# Patient Record
Sex: MALE | Race: BLACK OR AFRICAN AMERICAN | HISPANIC OR LATINO | URBAN - METROPOLITAN AREA
[De-identification: names, ages, dates, MRNs, and addresses within clinical notes are randomized per-mention and may not be internally consistent; named-entity substitution may affect disease eponyms.]

---

## 2019-12-16 ENCOUNTER — NEW PATIENT (OUTPATIENT)
Dept: URBAN - METROPOLITAN AREA CLINIC 30 | Facility: CLINIC | Age: 64
End: 2019-12-16
Payer: COMMERCIAL

## 2019-12-16 PROCEDURE — 92004 COMPRE OPH EXAM NEW PT 1/>: CPT | Performed by: OPTOMETRIST

## 2019-12-16 PROCEDURE — 92134 CPTRZ OPH DX IMG PST SGM RTA: CPT | Performed by: OPTOMETRIST

## 2019-12-16 ASSESSMENT — INTRAOCULAR PRESSURE
OS: 12
OD: 13

## 2019-12-16 ASSESSMENT — VISUAL ACUITY
OD: 20/25
OS: 20/25

## 2020-03-02 ENCOUNTER — FOLLOW UP ESTABLISHED (OUTPATIENT)
Dept: URBAN - METROPOLITAN AREA CLINIC 30 | Facility: CLINIC | Age: 65
End: 2020-03-02
Payer: COMMERCIAL

## 2020-03-02 PROCEDURE — 92133 CPTRZD OPH DX IMG PST SGM ON: CPT | Performed by: OPTOMETRIST

## 2020-03-02 PROCEDURE — 92083 EXTENDED VISUAL FIELD XM: CPT | Performed by: OPTOMETRIST

## 2020-03-02 PROCEDURE — 99213 OFFICE O/P EST LOW 20 MIN: CPT | Performed by: OPTOMETRIST

## 2020-03-02 PROCEDURE — 76514 ECHO EXAM OF EYE THICKNESS: CPT | Performed by: OPTOMETRIST

## 2020-03-02 ASSESSMENT — INTRAOCULAR PRESSURE
OD: 12
OS: 10

## 2021-04-27 ENCOUNTER — OFFICE VISIT (OUTPATIENT)
Dept: URBAN - METROPOLITAN AREA CLINIC 30 | Facility: CLINIC | Age: 66
End: 2021-04-27
Payer: COMMERCIAL

## 2021-04-27 DIAGNOSIS — H40.023 OPEN ANGLE WITH BORDERLINE FINDINGS, HIGH RISK, BILATERAL: Primary | ICD-10-CM

## 2021-04-27 DIAGNOSIS — H04.123 DRY EYE SYNDROME OF BILATERAL LACRIMAL GLANDS: ICD-10-CM

## 2021-04-27 DIAGNOSIS — H43.393 OTHER VITREOUS OPACITIES, BILATERAL: ICD-10-CM

## 2021-04-27 DIAGNOSIS — H11.153 PINGUECULA, BILATERAL: ICD-10-CM

## 2021-04-27 PROCEDURE — 92133 CPTRZD OPH DX IMG PST SGM ON: CPT | Performed by: OPTOMETRIST

## 2021-04-27 PROCEDURE — 92014 COMPRE OPH EXAM EST PT 1/>: CPT | Performed by: OPTOMETRIST

## 2021-04-27 RX ORDER — CARBOXYMETHYLCELLULOSE SODIUM 5 MG/ML
0.5 % SOLUTION/ DROPS OPHTHALMIC
Qty: 10 | Refills: 11 | Status: ACTIVE
Start: 2021-04-27

## 2021-04-27 ASSESSMENT — KERATOMETRY
OD: 42.35
OS: 42.48

## 2021-04-27 ASSESSMENT — INTRAOCULAR PRESSURE
OD: 12
OS: 11

## 2021-04-27 ASSESSMENT — VISUAL ACUITY
OS: 20/20
OD: 20/20

## 2021-04-27 NOTE — IMPRESSION/PLAN
Impression: Open angle with borderline findings, high risk, bilateral
+FHx-father Pachs 497/503 Plan: Significant cupping OU. IOPs stable and low, generally have been in low teens per previous records. Thin Pachs. 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 VF 3/2020 scatter OD and unreliable OS, watch for sup arc/nasal step. Update VF next visit.

## 2021-04-27 NOTE — IMPRESSION/PLAN
Impression: Dry eye syndrome of bilateral lacrimal glands: H04.123. Plan: Patient notes intermittent mild eye pain. He is not using ATs. Start ATs BID-QID OU.

## 2021-12-04 NOTE — IMPRESSION/PLAN
Impression: Other vitreous opacities, bilateral Plan: Stable. Retinas are flat and attached OU. Reviewed s/sx of RD and to call asap if occurs. Speaking Coherently

## 2022-10-21 ENCOUNTER — OFFICE VISIT (OUTPATIENT)
Dept: URBAN - METROPOLITAN AREA CLINIC 30 | Facility: CLINIC | Age: 67
End: 2022-10-21
Payer: COMMERCIAL

## 2022-10-21 DIAGNOSIS — H40.023 OPEN ANGLE WITH BORDERLINE FINDINGS, HIGH RISK, BILATERAL: Primary | ICD-10-CM

## 2022-10-21 DIAGNOSIS — H43.393 OTHER VITREOUS OPACITIES, BILATERAL: ICD-10-CM

## 2022-10-21 DIAGNOSIS — H04.123 DRY EYE SYNDROME OF BILATERAL LACRIMAL GLANDS: ICD-10-CM

## 2022-10-21 PROCEDURE — 92133 CPTRZD OPH DX IMG PST SGM ON: CPT

## 2022-10-21 PROCEDURE — 92014 COMPRE OPH EXAM EST PT 1/>: CPT

## 2022-10-21 ASSESSMENT — INTRAOCULAR PRESSURE
OS: 16
OD: 16

## 2022-10-21 ASSESSMENT — VISUAL ACUITY
OD: 20/20
OS: 20/20

## 2022-10-21 ASSESSMENT — KERATOMETRY
OD: 42.44
OS: 42.30

## 2022-10-21 NOTE — IMPRESSION/PLAN
Impression: Open angle with borderline findings, high risk, bilateral
+FHx-father Pachs 497/503 Plan: Significant cupping OU. IOPs stable and low, generally have been in low teens per previous records. Thin Pachs. 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 VF 3/2020 scatter OD and unreliable OS, watch for sup arc/nasal step. Update VF next visit. IOP today 16/16 on no gtts. OCT RNFL stable OD, OS. GCC inferior thinning but w/o corresponding RNFL loss. Monitor. Update HVF.

## 2024-05-16 ENCOUNTER — OFFICE VISIT (OUTPATIENT)
Dept: URBAN - METROPOLITAN AREA CLINIC 30 | Facility: CLINIC | Age: 69
End: 2024-05-16
Payer: COMMERCIAL

## 2024-05-16 DIAGNOSIS — H40.023 OPEN ANGLE WITH BORDERLINE FINDINGS, HIGH RISK, BILATERAL: Primary | ICD-10-CM

## 2024-05-16 DIAGNOSIS — H43.393 OTHER VITREOUS OPACITIES, BILATERAL: ICD-10-CM

## 2024-05-16 DIAGNOSIS — H25.813 COMBINED FORMS OF AGE-RELATED CATARACT, BILATERAL: ICD-10-CM

## 2024-05-16 PROCEDURE — 92014 COMPRE OPH EXAM EST PT 1/>: CPT

## 2024-05-16 PROCEDURE — 92134 CPTRZ OPH DX IMG PST SGM RTA: CPT

## 2024-05-16 PROCEDURE — 92133 CPTRZD OPH DX IMG PST SGM ON: CPT

## 2024-05-16 PROCEDURE — 92083 EXTENDED VISUAL FIELD XM: CPT

## 2024-05-16 ASSESSMENT — INTRAOCULAR PRESSURE
OS: 13
OD: 11

## 2024-05-16 ASSESSMENT — KERATOMETRY
OS: 42.42
OD: 42.48

## 2024-05-16 ASSESSMENT — VISUAL ACUITY
OS: 20/20
OD: 20/20